# Patient Record
Sex: FEMALE | Race: OTHER | Employment: UNEMPLOYED | URBAN - METROPOLITAN AREA
[De-identification: names, ages, dates, MRNs, and addresses within clinical notes are randomized per-mention and may not be internally consistent; named-entity substitution may affect disease eponyms.]

---

## 2021-12-09 ENCOUNTER — HOSPITAL ENCOUNTER (OUTPATIENT)
Age: 61
Discharge: HOME | End: 2021-12-09
Attending: EMERGENCY MEDICINE
Payer: MEDICAID

## 2021-12-09 DIAGNOSIS — Z20.822 ENCOUNTER FOR LABORATORY TESTING FOR COVID-19 VIRUS: Primary | ICD-10-CM

## 2021-12-09 PROCEDURE — 99202 OFFICE O/P NEW SF 15 MIN: CPT

## 2021-12-09 RX ORDER — GLIPIZIDE 10 MG/1
TABLET, FILM COATED, EXTENDED RELEASE ORAL
COMMUNITY

## 2021-12-09 RX ORDER — LISINOPRIL 20 MG/1
1 TABLET ORAL AS DIRECTED
COMMUNITY

## 2021-12-09 RX ORDER — GABAPENTIN 300 MG/1
1 CAPSULE ORAL AS DIRECTED
COMMUNITY

## 2021-12-09 RX ORDER — ATORVASTATIN CALCIUM 10 MG/1
TABLET, FILM COATED ORAL
COMMUNITY
Start: 2021-10-23

## 2021-12-09 RX ORDER — RANITIDINE 150 MG/1
TABLET ORAL
COMMUNITY

## 2021-12-17 NOTE — ED PROVIDER NOTES
Patient Seen in: [facility]      History   Patient presents with:  Covid-19 Test    Stated Complaint: covid test-no symptoms    Subjective:   HPI    61 yo female requesting covid test for travel. She is asymptomatic.      Objective:   History Disposition:  Discharge  12/9/2021  5:56 pm    Follow-up:  Giovanny Carrillo MD  [address] [place] [id number] [phone]      As needed          Medications Prescribed:  Discharge Medication List as of 12/9/2021  6:22 PM

## 2024-04-12 ENCOUNTER — HOSPITAL ENCOUNTER (EMERGENCY)
Facility: HOSPITAL | Age: 64
Discharge: HOME | End: 2024-04-12
Attending: EMERGENCY MEDICINE
Payer: MEDICAID

## 2024-04-12 ENCOUNTER — APPOINTMENT (OUTPATIENT)
Dept: CT IMAGING | Facility: HOSPITAL | Age: 64
End: 2024-04-12
Attending: EMERGENCY MEDICINE
Payer: MEDICAID

## 2024-04-12 VITALS
BODY MASS INDEX: 33.66 KG/M2 | SYSTOLIC BLOOD PRESSURE: 91 MMHG | TEMPERATURE: 97 F | WEIGHT: 190 LBS | RESPIRATION RATE: 18 BRPM | OXYGEN SATURATION: 99 % | HEIGHT: 63 IN | DIASTOLIC BLOOD PRESSURE: 72 MMHG | HEART RATE: 79 BPM

## 2024-04-12 DIAGNOSIS — K52.9 GASTROENTERITIS: Primary | ICD-10-CM

## 2024-04-12 LAB
ALBUMIN SERPL-MCNC: 4.7 G/DL (ref 3.2–4.8)
ALP LIVER SERPL-CCNC: 124 U/L
ALT SERPL-CCNC: 18 U/L
ANION GAP SERPL CALC-SCNC: 10 MMOL/L (ref 0–18)
AST SERPL-CCNC: 29 U/L (ref ?–34)
BASOPHILS # BLD AUTO: 0.07 X10(3) UL (ref 0–0.2)
BASOPHILS NFR BLD AUTO: 0.3 %
BILIRUB DIRECT SERPL-MCNC: 0.2 MG/DL (ref ?–0.3)
BILIRUB SERPL-MCNC: 0.5 MG/DL (ref 0.2–1.1)
BUN BLD-MCNC: 15 MG/DL (ref 9–23)
BUN/CREAT SERPL: 9.9 (ref 10–20)
CALCIUM BLD-MCNC: 9.9 MG/DL (ref 8.7–10.4)
CHLORIDE SERPL-SCNC: 106 MMOL/L (ref 98–112)
CO2 SERPL-SCNC: 19 MMOL/L (ref 21–32)
CREAT BLD-MCNC: 1.51 MG/DL
DEPRECATED RDW RBC AUTO: 48.5 FL (ref 35.1–46.3)
EGFRCR SERPLBLD CKD-EPI 2021: 39 ML/MIN/1.73M2 (ref 60–?)
EOSINOPHIL # BLD AUTO: 0.03 X10(3) UL (ref 0–0.7)
EOSINOPHIL NFR BLD AUTO: 0.1 %
ERYTHROCYTE [DISTWIDTH] IN BLOOD BY AUTOMATED COUNT: 15.7 % (ref 11–15)
GLUCOSE BLD-MCNC: 244 MG/DL (ref 70–99)
HCT VFR BLD AUTO: 46.3 %
HGB BLD-MCNC: 14.6 G/DL
IMM GRANULOCYTES # BLD AUTO: 0.12 X10(3) UL (ref 0–1)
IMM GRANULOCYTES NFR BLD: 0.5 %
LIPASE SERPL-CCNC: 44 U/L (ref 13–75)
LYMPHOCYTES # BLD AUTO: 3.01 X10(3) UL (ref 1–4)
LYMPHOCYTES NFR BLD AUTO: 13.6 %
MCH RBC QN AUTO: 27 PG (ref 26–34)
MCHC RBC AUTO-ENTMCNC: 31.5 G/DL (ref 31–37)
MCV RBC AUTO: 85.7 FL
MONOCYTES # BLD AUTO: 0.77 X10(3) UL (ref 0.1–1)
MONOCYTES NFR BLD AUTO: 3.5 %
NEUTROPHILS # BLD AUTO: 18.12 X10 (3) UL (ref 1.5–7.7)
NEUTROPHILS # BLD AUTO: 18.12 X10(3) UL (ref 1.5–7.7)
NEUTROPHILS NFR BLD AUTO: 82 %
OSMOLALITY SERPL CALC.SUM OF ELEC: 289 MOSM/KG (ref 275–295)
PLATELET # BLD AUTO: 534 10(3)UL (ref 150–450)
POTASSIUM SERPL-SCNC: 4.8 MMOL/L (ref 3.5–5.1)
PROT SERPL-MCNC: 8.4 G/DL (ref 5.7–8.2)
RBC # BLD AUTO: 5.4 X10(6)UL
SODIUM SERPL-SCNC: 135 MMOL/L (ref 136–145)
WBC # BLD AUTO: 22.1 X10(3) UL (ref 4–11)

## 2024-04-12 PROCEDURE — 96374 THER/PROPH/DIAG INJ IV PUSH: CPT

## 2024-04-12 PROCEDURE — 99284 EMERGENCY DEPT VISIT MOD MDM: CPT

## 2024-04-12 PROCEDURE — 96361 HYDRATE IV INFUSION ADD-ON: CPT

## 2024-04-12 PROCEDURE — 80076 HEPATIC FUNCTION PANEL: CPT | Performed by: EMERGENCY MEDICINE

## 2024-04-12 PROCEDURE — 80048 BASIC METABOLIC PNL TOTAL CA: CPT | Performed by: EMERGENCY MEDICINE

## 2024-04-12 PROCEDURE — 85025 COMPLETE CBC W/AUTO DIFF WBC: CPT | Performed by: EMERGENCY MEDICINE

## 2024-04-12 PROCEDURE — 74176 CT ABD & PELVIS W/O CONTRAST: CPT | Performed by: EMERGENCY MEDICINE

## 2024-04-12 PROCEDURE — 83690 ASSAY OF LIPASE: CPT | Performed by: EMERGENCY MEDICINE

## 2024-04-12 RX ORDER — ONDANSETRON 4 MG/1
4 TABLET, ORALLY DISINTEGRATING ORAL EVERY 4 HOURS PRN
Qty: 14 TABLET | Refills: 0 | Status: SHIPPED | OUTPATIENT
Start: 2024-04-12 | End: 2024-04-19

## 2024-04-12 RX ORDER — ONDANSETRON 2 MG/ML
4 INJECTION INTRAMUSCULAR; INTRAVENOUS ONCE
Status: COMPLETED | OUTPATIENT
Start: 2024-04-12 | End: 2024-04-12

## 2024-04-12 NOTE — ED INITIAL ASSESSMENT (HPI)
Patient arrives with reports of abd pain, vomiting, and diarrhea since this AM. Denies fevers.  Also reports dysuria.

## 2024-04-12 NOTE — ED PROVIDER NOTES
Patient Seen in: [facility] Emergency Department    History     Chief Complaint   Patient presents with    Nausea/Vomiting/Diarrhea       HPI    63-year-old female presents ER with complaint of nausea vomiting diarrhea that started for the morning patient states that she had eaten fish and broccoli last night at 9 PM then at 4 AM started having vomiting diarrhea.  Patient notes 10 episodes of diarrhea as well as 3 episodes of vomiting.  Patient denies any other complaints.    History reviewed.   Past Medical History:    Diabetes (HCC)    Essential hypertension       History reviewed. History reviewed. No pertinent surgical history.      Medications :  (Not in a hospital admission)       No family history on file.    Smoking Status:   Social History     Socioeconomic History    Marital status:    Tobacco Use    Smoking status: Never    Smokeless tobacco: Never   Substance and Sexual Activity    Alcohol use: Never    Drug use: Never       ROS  All pertinent positives for the review of systems are mentioned in the HPI  All other organ systems are reviewed and are negative.    Constitutional and vital signs reviewed.      Social History and Family History elements reviewed from today, pertinent positives to the presenting problem noted.    Physical Exam     ED Triage Vitals [04/12/24 1143]   BP (!) 76/54   Pulse 70   Resp 22   Temp 97.1 °F (36.2 °C)   Temp src Temporal   SpO2 96 %   O2 Device None (Room air)       All measures to prevent infection transmission during my interaction with the patient were taken. The patient was already wearing a droplet mask on my arrival to the room. Personal protective equipment including droplet mask, eye protection, and gloves were worn throughout the duration of the exam.  Handwashing was performed prior to and after the exam.  Stethoscope and any equipment used during my examination was cleaned with super sani-cloth germicidal wipes following the exam.     Physical  Exam  Vitals and nursing note reviewed.   Constitutional:       Appearance: She is well-developed.   HENT:      Head: Normocephalic and atraumatic.      Right Ear: External ear normal.      Left Ear: External ear normal.      Nose: Nose normal.   Eyes:      Conjunctiva/sclera: Conjunctivae normal.      Pupils: Pupils are equal, round, and reactive to light.   Cardiovascular:      Rate and Rhythm: Normal rate and regular rhythm.      Heart sounds: Normal heart sounds.   Pulmonary:      Effort: Pulmonary effort is normal.      Breath sounds: Normal breath sounds.   Abdominal:      General: Bowel sounds are normal.      Palpations: Abdomen is soft.      Tenderness: There is no abdominal tenderness. There is no right CVA tenderness, left CVA tenderness, guarding or rebound.   Musculoskeletal:         General: Normal range of motion.      Cervical back: Normal range of motion and neck supple.   Skin:     General: Skin is warm and dry.   Neurological:      Mental Status: She is alert and oriented to person, place, and time.      Deep Tendon Reflexes: Reflexes are normal and symmetric.   Psychiatric:         Behavior: Behavior normal.         Thought Content: Thought content normal.         Judgment: Judgment normal.         ED Course        Labs Reviewed   BASIC METABOLIC PANEL (8) - Abnormal; Notable for the following components:       Result Value    Glucose 244 (*)     Sodium 135 (*)     CO2 19.0 (*)     Creatinine 1.51 (*)     BUN/CREA Ratio 9.9 (*)     eGFR-Cr 39 (*)     All other components within normal limits   HEPATIC FUNCTION PANEL (7) - Abnormal; Notable for the following components:    Total Protein 8.4 (*)     All other components within normal limits   CBC W/ DIFFERENTIAL - Abnormal; Notable for the following components:    WBC 22.1 (*)     RBC 5.40 (*)     RDW-SD 48.5 (*)     RDW 15.7 (*)     .0 (*)     Neutrophil Absolute Prelim 18.12 (*)     Neutrophil Absolute 18.12 (*)     All other components  within normal limits   LIPASE - Normal   CBC WITH DIFFERENTIAL WITH PLATELET    Narrative:     The following orders were created for panel order CBC With Differential With Platelet.                  Procedure                               Abnormality         Status                                     ---------                               -----------         ------                                     CBC W/ DIFFERENTIAL[[id number]]          Abnormal            Final result                                                 Please view results for these tests on the individual orders.   URINALYSIS WITH CULTURE REFLEX         Imaging Results Available and Reviewed while in ED: CT ABDOMEN+PELVIS(CPT=74176)    Result Date: 4/12/2024  CONCLUSION:  1. Intraluminal fluid scattered throughout the small bowel and colon, which can be seen with gastroenteritis or underlying malabsorption. 2. Otherwise no acute intra-abdominal process. 3. Uncomplicated distal colonic diverticulosis. 4. Status post cholecystectomy.  No biliary ductal dilatation. 5. Lesser incidental findings as above.    Dictated by (CST): Joce Verdugo MD on 4/12/2024 at 12:45 PM     Finalized by (CST): Joce Verdugo MD on 4/12/2024 at 12:49 PM         ED Medications Administered:   Medications   ondansetron (Zofran) 4 MG/2ML injection 4 mg (4 mg Intravenous Given 4/12/24 1231)   sodium chloride 0.9 % IV bolus 1,000 mL (1,000 mL Intravenous New Bag 4/12/24 1224)         MDM     Vitals:    04/12/24 1143 04/12/24 1204 04/12/24 1233 04/12/24 1317   BP: (!) 76/54 (!) 88/60 91/69 91/72   Pulse: 70 75 78 79   Resp: 22 18 18 18   Temp: 97.1 °F (36.2 °C)      TempSrc: Temporal      SpO2: 96% 98% 96% 99%   Weight: 86.2 kg      Height: 160 cm (5' 3\")        *I personally reviewed and interpreted all ED vitals.  I also personally reviewed all labs and imaging if ordered    Pulse Ox: 98%, Room air, Normal     Monitor Interpretation:   normal sinus  rhythm    Differential Diagnosis/ Diagnostic Considerations: Gastritis, gastroenteritis, food poisoning, viral syndrome.    Medical Record Review: I personally reviewed available prior medical records for any recent pertinent discharge summaries, testing, and procedures and reviewed those reports.    Complicating Factors: The patient already has does not have any pertinent problems on file. to contribute to the complexity of this ED evaluation.    Medical Decision Making  63-year-old female presents ER with complaint of vomiting and diarrhea since 3 AM.  Patient states her last bowel movement was 3 and half hours ago.  Patient hypotensive on arrival to ER received 1 L of IV fluids and pressures improved.  Patient states she is no longer nauseous.  Patient CT abdomen pelvis showed no acute intra-abdominal process despite an elevation of WBC count of 22,000.  Patient given Zofran for nausea and instructed to increase fluid intake.  Patient's daughter bedside made aware of the discharge planning disposition.    Problems Addressed:  Gastroenteritis: acute illness or injury    Amount and/or Complexity of Data Reviewed  Independent Historian:      Details: Medical history obtained from the daughter states she had 10 episodes of diarrhea and vomited 3 times.  Patient is Icelandic-speaking only  Labs: ordered. Decision-making details documented in ED Course.  Radiology: ordered and independent interpretation performed. Decision-making details documented in ED Course.     Details: CT abdomen pelvis inter by myself shows no constipation or bowel obstruction.    Risk  Prescription drug management.        Condition upon leaving the department: Stable    Disposition and Plan     Clinical Impression:  1. Gastroenteritis        Disposition:  Discharge    Follow-up:  Divina Alvarez DO  [address] [address]  [phone]    Schedule an appointment as soon as possible for a visit  If symptoms  worsen      Medications Prescribed:  Current Discharge Medication List        START taking these medications    Details   ondansetron 4 MG Oral Tablet Dispersible Take 1 tablet (4 mg total) by mouth every 4 (four) hours as needed for Nausea.  Qty: 14 tablet, Refills: 0

## 2025-01-12 ENCOUNTER — HOSPITAL ENCOUNTER (EMERGENCY)
Facility: HOSPITAL | Age: 65
Discharge: HOME | End: 2025-01-12
Attending: EMERGENCY MEDICINE
Payer: MEDICAID

## 2025-01-12 ENCOUNTER — APPOINTMENT (OUTPATIENT)
Dept: CT IMAGING | Facility: HOSPITAL | Age: 65
End: 2025-01-12
Attending: EMERGENCY MEDICINE
Payer: MEDICAID

## 2025-01-12 VITALS
TEMPERATURE: 99 F | SYSTOLIC BLOOD PRESSURE: 127 MMHG | OXYGEN SATURATION: 97 % | DIASTOLIC BLOOD PRESSURE: 73 MMHG | HEART RATE: 100 BPM | RESPIRATION RATE: 20 BRPM

## 2025-01-12 DIAGNOSIS — K59.00 CONSTIPATION, UNSPECIFIED CONSTIPATION TYPE: Primary | ICD-10-CM

## 2025-01-12 PROCEDURE — 74177 CT ABD & PELVIS W/CONTRAST: CPT | Performed by: EMERGENCY MEDICINE

## 2025-01-12 PROCEDURE — 99284 EMERGENCY DEPT VISIT MOD MDM: CPT

## 2025-01-12 RX ORDER — POLYETHYLENE GLYCOL 3350 17 G/17G
17 POWDER, FOR SOLUTION ORAL DAILY PRN
Qty: 12 EACH | Refills: 0 | Status: SHIPPED | OUTPATIENT
Start: 2025-01-12 | End: 2025-02-11

## 2025-01-12 RX ORDER — SODIUM PHOSPHATE,MONO-DIBASIC 19G-7G/118
1 ENEMA (ML) RECTAL ONCE AS NEEDED
Qty: 1 ENEMA | Refills: 0 | Status: SHIPPED | OUTPATIENT
Start: 2025-01-12 | End: 2025-01-12

## 2025-01-12 RX ORDER — ONDANSETRON 8 MG/1
8 TABLET, ORALLY DISINTEGRATING ORAL EVERY 4 HOURS PRN
Qty: 10 TABLET | Refills: 0 | Status: SHIPPED | OUTPATIENT
Start: 2025-01-12 | End: 2025-01-19

## 2025-01-13 NOTE — ED PROVIDER NOTES
Patient Seen in: [facility] Emergency Department    History     Chief Complaint   Patient presents with    Constipation       HPI    64-year-old female who presents to the emergency department constipated 7 days and 6 days ago had spinal surgery.  She reports mild nausea as well as left lower quadrant abdominal pain.  No fevers.  No discharge from the surgical sites.  She has been on senna docusate, taking oxycodone for pain.    History reviewed.   Past Medical History:    Diabetes (HCC)    Essential hypertension       History reviewed. History reviewed. No pertinent surgical history.      Medications :  Prescriptions Prior to Admission[1]     No family history on file.    Smoking Status:   Social History     Socioeconomic History    Marital status:    Tobacco Use    Smoking status: Never    Smokeless tobacco: Never   Substance and Sexual Activity    Alcohol use: Never    Drug use: Never       Constitutional and vital signs reviewed.      Social History and Family History elements reviewed from today, pertinent positives to the presenting problem noted.    Physical Exam     ED Triage Vitals [01/12/25 1845]   /73   Pulse 100   Resp 20   Temp 98.9 °F (37.2 °C)   Temp src    SpO2 97 %   O2 Device None (Room air)       All measures to prevent infection transmission during my interaction with the patient were taken. The patient was already wearing a droplet mask on my arrival to the room. Personal protective equipment was worn throughout the duration of the exam.  Handwashing was performed prior to and after the exam.  Stethoscope and any equipment used during my examination was cleaned with super sani-cloth germicidal wipes following the exam.     Physical Exam    General: NAD  Head: Normocephalic and atraumatic.  Mouth/Throat/Ears/Nose: Oropharynx is clear    Eyes: Conjunctivae and EOM are normal.   Neck: Normal range of motion. Supple.   Cardiovascular: Normal rate, regular rhythm, normal heart  sounds.  Respiratory/Chest: Clear and equal bilaterally. Exhibits no tenderness.  Gastrointestinal: Soft, surgical site without dehiscence, no discharge nor ttp, non-distended. Bowel sounds are normal.   Back: surgical site without dehiscence, no discharge nor ttp  Musculoskeletal:No swelling or deformity.   Neurological: Alert and appropriate. No focal deficits.   Skin: Skin is warm and dry. No pallor.         ED Course      Labs Reviewed - No data to display    As Interpreted by me    Imaging Results Available and Reviewed while in ED: CT ABDOMEN+PELVIS(CONTRAST ONLY)(CPT=74177)    Result Date: 1/12/2025  CONCLUSION:   Recent anterior and posterior L5-S1 spinal fusion.  Elongated 12.0 cm anterior abdominal wall deep subcutaneous collection of air and fluid.  This may represent a postoperative seroma.  No discrete enhancing peripheral rim to suggest superimposed infection at this time.  Correlate with the clinical assessment is recommended however  Large amount of stool throughout the colon, suggesting constipation.  Outpatient workup at a dedicated breast imaging center with bilateral diagnostic mammogram with possible ultrasound recommended for a 1.1 cm nodular density within the inner central aspect of the right breast at approximately 3 o'clock 4 cm from the nipple      Dictated by (CST): Nahomi Joshua MD on 1/12/2025 at 8:21 PM     Finalized by (CST): Nahomi Joshua MD on 1/12/2025 at 8:31 PM         ED Medications Administered:   Medications   iopamidol 76% (ISOVUE-370) injection for power injector (80 mL Intravenous Given 1/12/25 1952)         MDM     Vitals:    01/12/25 1845   BP: 127/73   Pulse: 100   Resp: 20   Temp: 98.9 °F (37.2 °C)   SpO2: 97%     *I personally reviewed and interpreted all ED vitals.    Pulse Ox: 97%, Room air, Normal          Medical Decision Making      Differential Diagnosis/ Diagnostic Considerations: constipation, small bowel obstruction     Complicating Factors: The patient  already has does not have any pertinent problems on file. to contribute to the complexity of this ED evaluation.    I reviewed prior chart records including lab results from earlier today when patient went to [place] emergency department.  These results are unremarkable on my interpretation.  CT abdomen pelvis was performed and notable for increased fecal burden, no evidence of bowel obstruction.  Agree with report.  Incidental right breast nodule seen, discussed follow-up for this.  Optimize bowel regimen prescribed.  We discussed strict return precautions.    Considered admission however the patient and the sons are comfortable with discharge plan.    Dc In stable condition.  Patient is comfortable with the plan.      Disposition and Plan     Clinical Impression:  1. Constipation, unspecified constipation type        Disposition:  Discharge    Follow-up:  Divina Alvarez DO  [address] [address]  [phone]    Schedule an appointment as soon as possible for a visit in 1 day(s)  Please have follow up for the following CT findings: \"Outpatient workup at a dedicated breast imaging center with bilateral diagnostic mammogram with possible ultrasound recommended for a 1.1 cm nodular density within the inner central aspect of the right breast at approximately 3 o'clock 4 cm from the   nipple \"      Medications Prescribed:  Discharge Medication List as of 1/12/2025  8:50 PM        START taking these medications    Details   ondansetron 8 MG Oral Tablet Dispersible Take 1 tablet (8 mg total) by mouth every 4 (four) hours as needed for Nausea., Normal, Disp-10 tablet, R-0      Magnesium Citrate Oral Solution Take 296 mL by mouth once for 1 dose., Normal, Disp-296 mL, R-0      polyethylene glycol, PEG 3350, 17 g Oral Powd Pack Take 17 g by mouth daily as needed., Normal, Disp-12 each, R-0      FLEET ENEMA 7-19 GM/118ML Rectal Enema Place 1 enema (118 mL total) rectally once as needed., Normal,  Disp-1 enema, R-0                              [1] (Not in a hospital admission)

## 2025-01-13 NOTE — DISCHARGE INSTRUCTIONS
Return if worsening symptoms. Take the magnesium citrate and miralax. If no improvement, use the enema

## 2025-01-13 NOTE — ED INITIAL ASSESSMENT (HPI)
S: pt presents to with c/o constipation since back surgery at [facility]. Pt went there today, and had labs but ER wait was too long. Taking stool softener 1 tab every night.